# Patient Record
Sex: FEMALE
[De-identification: names, ages, dates, MRNs, and addresses within clinical notes are randomized per-mention and may not be internally consistent; named-entity substitution may affect disease eponyms.]

---

## 2023-09-30 ENCOUNTER — NURSE TRIAGE (OUTPATIENT)
Dept: OTHER | Facility: CLINIC | Age: 88
End: 2023-09-30

## 2023-09-30 NOTE — TELEPHONE ENCOUNTER
Location of patient: [place] [address] [address] for patient    Subjective: Caller states that she just noticed dark colored blood in pt's urine bag    Current Symptoms:   Dark red blood with sediment in urine catheter Noticed about 10 minutes ago  No pain  VS WNL  Tea colored    Denies:  Fever  SOB  Dizziness  Nausea   Vomit  Blood in urine over 24 hrs      Pain Severity: 0/10; Temperature:  denies    What has been tried: nothing    Recommended disposition:   Advised to see PCP within 24 hrs    Care advice provided, patient verbalizes understanding; denies any other questions or concerns.     Outcome: Paging On call provider        This triage is a result of a call to the [address] [place] [address]      Reason for Disposition   Urinary catheter, questions about   [1] Bloody or red-colored urine AND [2] no recent prostate or bladder surgery  (Exception: Brief episode and urine now clear.)    Protocols used: Urine - Blood In-ADULT-AH, Urinary Catheter (e.g., Dominguez) Symptoms and Questions-ADULT-AH